# Patient Record
Sex: FEMALE | Race: BLACK OR AFRICAN AMERICAN | Employment: UNEMPLOYED | URBAN - METROPOLITAN AREA
[De-identification: names, ages, dates, MRNs, and addresses within clinical notes are randomized per-mention and may not be internally consistent; named-entity substitution may affect disease eponyms.]

---

## 2022-07-01 ENCOUNTER — HOSPITAL ENCOUNTER (EMERGENCY)
Age: 32
Discharge: HOME | End: 2022-07-02
Payer: COMMERCIAL

## 2022-07-01 ENCOUNTER — APPOINTMENT (OUTPATIENT)
Dept: GENERAL RADIOLOGY | Age: 32
End: 2022-07-01
Payer: COMMERCIAL

## 2022-07-01 DIAGNOSIS — S63.296A DISLOCATION OF DISTAL INTERPHALANGEAL (DIP) JOINT OF RIGHT LITTLE FINGER, INITIAL ENCOUNTER: Primary | ICD-10-CM

## 2022-07-01 PROCEDURE — 73140 X-RAY EXAM OF FINGER(S): CPT

## 2022-07-01 PROCEDURE — 26770 TREAT FINGER DISLOCATION: CPT

## 2022-07-01 PROCEDURE — 99283 EMERGENCY DEPT VISIT LOW MDM: CPT

## 2022-07-01 PROCEDURE — 6370000000 HC RX 637 (ALT 250 FOR IP): Performed by: NURSE PRACTITIONER

## 2022-07-01 RX ORDER — IBUPROFEN 600 MG/1
600 TABLET ORAL ONCE
Status: COMPLETED | OUTPATIENT
Start: 2022-07-01 | End: 2022-07-01

## 2022-07-01 RX ADMIN — IBUPROFEN 600 MG: 600 TABLET, FILM COATED ORAL at 22:05

## 2022-07-01 ASSESSMENT — PAIN DESCRIPTION - FREQUENCY: FREQUENCY: CONTINUOUS

## 2022-07-01 ASSESSMENT — PAIN DESCRIPTION - DESCRIPTORS: DESCRIPTORS: SHARP;SHOOTING

## 2022-07-01 ASSESSMENT — PAIN - FUNCTIONAL ASSESSMENT: PAIN_FUNCTIONAL_ASSESSMENT: 0-10

## 2022-07-01 ASSESSMENT — PAIN SCALES - GENERAL: PAINLEVEL_OUTOF10: 9

## 2022-07-01 ASSESSMENT — ENCOUNTER SYMPTOMS
CHEST TIGHTNESS: 0
DIARRHEA: 0
ABDOMINAL PAIN: 0
SHORTNESS OF BREATH: 0
VOMITING: 0
NAUSEA: 0

## 2022-07-01 ASSESSMENT — PAIN DESCRIPTION - ORIENTATION: ORIENTATION: RIGHT

## 2022-07-01 ASSESSMENT — PAIN DESCRIPTION - LOCATION: LOCATION: FINGER (COMMENT WHICH ONE)

## 2022-07-01 ASSESSMENT — PAIN DESCRIPTION - ONSET: ONSET: SUDDEN

## 2022-07-01 ASSESSMENT — PAIN DESCRIPTION - PAIN TYPE: TYPE: ACUTE PAIN

## 2022-07-01 NOTE — Clinical Note
Barrett Reynolds was seen and treated in our emergency department on 7/1/2022. She may return to work on 07/03/2022. If you have any questions or concerns, please don't hesitate to call.       Carol Pantoja, APRN - CNP

## 2022-07-02 VITALS
WEIGHT: 120.8 LBS | HEART RATE: 89 BPM | HEIGHT: 65 IN | SYSTOLIC BLOOD PRESSURE: 133 MMHG | DIASTOLIC BLOOD PRESSURE: 75 MMHG | BODY MASS INDEX: 20.12 KG/M2 | RESPIRATION RATE: 16 BRPM | OXYGEN SATURATION: 99 % | TEMPERATURE: 98.2 F

## 2022-07-02 ASSESSMENT — PAIN DESCRIPTION - FREQUENCY: FREQUENCY: CONTINUOUS

## 2022-07-02 ASSESSMENT — PAIN - FUNCTIONAL ASSESSMENT: PAIN_FUNCTIONAL_ASSESSMENT: 0-10

## 2022-07-02 ASSESSMENT — PAIN DESCRIPTION - ONSET: ONSET: ON-GOING

## 2022-07-02 ASSESSMENT — PAIN SCALES - GENERAL: PAINLEVEL_OUTOF10: 5

## 2022-07-02 NOTE — ED PROVIDER NOTES
[facility]        Pt Name: Terence Aguayo  MRN: [id number]  [place] 1990  Date of evaluation: 7/1/2022  Provider: DENISA Hernandez CNP  PCP: No primary care provider on file. Note Started: 10:53 PM EDT       JYOTI. I have evaluated this patient. My supervising physician was available for consultation. CHIEF COMPLAINT       Chief Complaint   Patient presents with    Finger Injury     From home. PT was playing basketball around 2030 and injured right pinky finger when it was hit with the ball. Pain and limited movement, mild swelling noted. HISTORY OF PRESENT ILLNESS   (Location, Timing/Onset, Context/Setting, Quality, Duration, Modifying Factors, Severity, Associated Signs and Symptoms)  Note limiting factors. Chief Complaint: finger injury     Terence Aguayo is a 29 y.o. female who presents to the emergency department with injury to the right little finger. The patient is concerned for dislocation versus fracture, pain and swelling noted at the distal interphalangeal joint of the right fifth finger. Patient reports that she injured herself today while playing basketball. Denies other injury. Denies any headache, fever, lightheadedness, dizziness, visual disturbances. No chest pain or pressure. No neck or back pain. No shortness of breath, cough, or congestion. No abdominal pain, nausea, vomiting, diarrhea, constipation, or dysuria. No rash. Nursing Notes were all reviewed and agreed with or any disagreements were addressed in the HPI. REVIEW OF SYSTEMS    (2-9 systems for level 4, 10 or more for level 5)     Review of Systems   Constitutional: Negative for activity change, chills and fever. Respiratory: Negative for chest tightness and shortness of breath. Cardiovascular: Negative for chest pain. Gastrointestinal: Negative for abdominal pain, diarrhea, nausea and vomiting.    Genitourinary: Negative for dysuria. Musculoskeletal:        Right little finger injury   All other systems reviewed and are negative. Positives and Pertinent negatives as per HPI. Except as noted above in the ROS, all other systems were reviewed and negative. PAST MEDICAL HISTORY     Past Medical History:   Diagnosis Date    Anxiety     Migraines     Scoliosis          SURGICAL HISTORY   No past surgical history on file. CURRENTMEDICATIONS       There are no discharge medications for this patient. ALLERGIES     Patient has no known allergies. FAMILYHISTORY     No family history on file. SOCIAL HISTORY       Social History     Tobacco Use    Smoking status: Current Some Day Smoker     Types: E-Cigarettes    Smokeless tobacco: Never Used    Tobacco comment: rarely   Vaping Use    Vaping Use: Some days    Substances: Nicotine   Substance Use Topics    Alcohol use: Yes     Comment: occasionally    Drug use: Never       SCREENINGS    [place] Coma Scale  Eye Opening: Spontaneous  Best Verbal Response: Oriented  Best Motor Response: Obeys commands  Ant Coma Scale Score: 15        PHYSICAL EXAM    (up to 7 for level 4, 8 or more for level 5)     ED Triage Vitals [07/01/22 2151]   BP Temp Temp Source Heart Rate Resp SpO2 Height Weight   133/75 98.2 °F (36.8 °C) Oral (!) 107 16 99 % 5' 5\" (1.651 m) 120 lb 12.8 oz (54.8 kg)       Physical Exam  Vitals and nursing note reviewed. Constitutional:       Appearance: She is well-developed. She is not diaphoretic. HENT:      Head: Normocephalic and atraumatic. Right Ear: External ear normal.      Left Ear: External ear normal.   Eyes:      General:         Right eye: No discharge. Left eye: No discharge. Neck:      Vascular: No JVD. Cardiovascular:      Rate and Rhythm: Normal rate and regular rhythm. Pulses: Normal pulses. Heart sounds: Normal heart sounds.    Pulmonary:      Effort: Pulmonary effort is normal. No respiratory distress. Breath sounds: Normal breath sounds. Abdominal:      Palpations: Abdomen is soft. Musculoskeletal:         General: Normal range of motion. Right hand: Swelling present. Cervical back: Normal range of motion and neck supple. Comments: Mild deformity of right little finger   Skin:     General: Skin is warm and dry. Coloration: Skin is not pale. Neurological:      Mental Status: She is alert and oriented to person, place, and time. Psychiatric:         Behavior: Behavior normal.         DIAGNOSTIC RESULTS   LABS:    Labs Reviewed - No data to display    When ordered only abnormal lab results are displayed. All other labs were within normal range or not returned as of this dictation. EKG: When ordered, EKG's are interpreted by the Emergency Department Physician in the absence of a cardiologist.  Please see their note for interpretation of EKG. RADIOLOGY:   Non-plain film images such as CT, Ultrasound and MRI are read by the radiologist. Plain radiographic images are visualized and preliminarily interpreted by the ED Provider with the below findings:        Interpretation per the Radiologist below, if available at the time of this note:    XR FINGER RIGHT (MIN 2 VIEWS)   Final Result   Dislocation of the little finger DIP without displaced fracture. XR FINGER RIGHT (MIN 2 VIEWS)    Result Date: 7/1/2022  EXAMINATION: THREE XRAY VIEWS OF THE RIGHT FINGERS 7/1/2022 9:59 pm COMPARISON: None. HISTORY: ORDERING SYSTEM PROVIDED HISTORY: injury TECHNOLOGIST PROVIDED HISTORY: Reason for exam:->injury Reason for Exam: finger injury FINDINGS: Dislocation of the little finger DIP. No displaced fracture. Minimal edema in the overlying soft tissues. Dislocation of the little finger DIP without displaced fracture.            PROCEDURES   Unless otherwise noted below, none     Procedures    CRITICAL CARE TIME       CONSULTS:  None      EMERGENCY DEPARTMENT COURSE and DIFFERENTIAL DIAGNOSIS/MDM:   Vitals:    Vitals:    07/01/22 2151 07/02/22 0004   BP: 133/75    Pulse: (!) 107 89   Resp: 16 16   Temp: 98.2 °F (36.8 °C)    TempSrc: Oral    SpO2: 99% 99%   Weight: 120 lb 12.8 oz (54.8 kg)    Height: 5' 5\" (1.651 m)        Patient was given the following medications:  Medications   ibuprofen (ADVIL;MOTRIN) tablet 600 mg (600 mg Oral Given 7/1/22 2205)         Is this patient to be included in the SEP-1 Core Measure due to severe sepsis or septic shock? No   Exclusion criteria - the patient is NOT to be included for SEP-1 Core Measure due to: Infection is not suspected    Reduction performed by Timi Mayes, please see her note. XR FINGER RIGHT (MIN 2 VIEWS) (Final result)  Result time 07/01/22 22:30:23  Final result by Cris Middleton MD (07/01/22 22:30:23)                Impression:    Dislocation of the little finger DIP without displaced fracture. Ibuprofen given for pain    I estimate there is LOW risk for FRACTURE, COMPARTMENT SYNDROME, DEEP VENOUS THROMBOSIS, SEPTIC ARTHRITIS, TENDON OR NEUROVASCULAR INJURY, thus I consider the discharge disposition reasonable. FINAL IMPRESSION      1. Dislocation of distal interphalangeal (DIP) joint of right little finger, initial encounter          DISPOSITION/PLAN   DISPOSITION Decision To Discharge 07/01/2022 11:49:20 PM      PATIENT REFERRED TO:  Kyle Rod MD  [address] [address]  [phone]    Schedule an appointment as soon as possible for a visit         DISCHARGE MEDICATIONS:  There are no discharge medications for this patient. DISCONTINUED MEDICATIONS:  There are no discharge medications for this patient.              (Please note that portions of this note were completed with a voice recognition program.  Efforts were made to edit the dictations but occasionally words are mis-transcribed.)    DENISA Morgan CNP (electronically signed) Camilla Noble, DENISA - CNP  07/02/22 5054

## 2022-08-01 ENCOUNTER — TELEPHONE (OUTPATIENT)
Dept: ORTHOPEDIC SURGERY | Age: 32
End: 2022-08-01

## 2022-08-01 NOTE — TELEPHONE ENCOUNTER
SPOKE WITH PATIENT 8/1 SHE WOULD LIKE APPOINTMENT BEFORE 8/15 OFFERED NEW PATIENT WITH DR. GILLIAM ON 8/18 PATIENT STATED SHE WOULD CALL US BACK TO SCHEDULE